# Patient Record
Sex: MALE | Race: OTHER | NOT HISPANIC OR LATINO | Employment: UNEMPLOYED | URBAN - METROPOLITAN AREA
[De-identification: names, ages, dates, MRNs, and addresses within clinical notes are randomized per-mention and may not be internally consistent; named-entity substitution may affect disease eponyms.]

---

## 2019-12-13 ENCOUNTER — HOSPITAL ENCOUNTER (EMERGENCY)
Facility: HOSPITAL | Age: 24
Discharge: HOME | End: 2019-12-13
Attending: EMERGENCY MEDICINE

## 2019-12-13 VITALS
HEART RATE: 88 BPM | HEIGHT: 64 IN | DIASTOLIC BLOOD PRESSURE: 66 MMHG | RESPIRATION RATE: 20 BRPM | OXYGEN SATURATION: 98 % | BODY MASS INDEX: 30.73 KG/M2 | WEIGHT: 180 LBS | TEMPERATURE: 99 F | SYSTOLIC BLOOD PRESSURE: 144 MMHG

## 2019-12-13 DIAGNOSIS — R10.9 ABDOMINAL PAIN, UNSPECIFIED ABDOMINAL LOCATION: Primary | ICD-10-CM

## 2019-12-13 LAB
BILIRUB UR QL STRIP: NEGATIVE
CLARITY UR: CLEAR
COLOR UR: YELLOW
GLUCOSE UR QL STRIP: NEGATIVE
HGB UR QL STRIP: NEGATIVE
KETONES UR QL STRIP: NEGATIVE
LEUKOCYTE ESTERASE UR QL STRIP: NEGATIVE
NITRITE UR QL STRIP: NEGATIVE
PH UR STRIP: 5 [PH] (ref 5–8)
PROT UR QL STRIP: NEGATIVE
SP GR UR STRIP: 1.02 (ref 1–1.03)
URN SPEC COLLECT METH UR: NORMAL
UROBILINOGEN UR STRIP-ACNC: NEGATIVE EU/DL

## 2019-12-13 PROCEDURE — 96372 THER/PROPH/DIAG INJ SC/IM: CPT

## 2019-12-13 PROCEDURE — 81003 URINALYSIS AUTO W/O SCOPE: CPT

## 2019-12-13 PROCEDURE — 87491 CHLMYD TRACH DNA AMP PROBE: CPT

## 2019-12-13 PROCEDURE — 63600175 PHARM REV CODE 636 W HCPCS: Performed by: NURSE PRACTITIONER

## 2019-12-13 PROCEDURE — 99284 EMERGENCY DEPT VISIT MOD MDM: CPT | Mod: 25

## 2019-12-13 RX ORDER — DICYCLOMINE HYDROCHLORIDE 20 MG/1
20 TABLET ORAL 2 TIMES DAILY
Qty: 20 TABLET | Refills: 0 | Status: SHIPPED | OUTPATIENT
Start: 2019-12-13 | End: 2020-01-12

## 2019-12-13 RX ORDER — KETOROLAC TROMETHAMINE 30 MG/ML
30 INJECTION, SOLUTION INTRAMUSCULAR; INTRAVENOUS
Status: COMPLETED | OUTPATIENT
Start: 2019-12-13 | End: 2019-12-13

## 2019-12-13 RX ADMIN — KETOROLAC TROMETHAMINE 30 MG: 30 INJECTION, SOLUTION INTRAMUSCULAR; INTRAVENOUS at 10:12

## 2019-12-14 NOTE — ED PROVIDER NOTES
"Encounter Date: 12/13/2019    SCRIBE #1 NOTE: I, Giovanny Copeland, am scribing for, and in the presence of,  Barbi Parsons NP. I have scribed the following portions of the note - Other sections scribed: HPI, ROS.       History     Chief Complaint   Patient presents with    Abdominal Pain     pt report sharp pain in abdomen (LLQ) around 0300 this am and denies nvfd.  he has not taken      CC: Abdominal Pain    HPI: This 24 y.o. Male with no pertinent past medical history presents to the ED for an evaluation of sharp LLQ abdominal pain with associated "thick urine" and mild radiation to L testicular pain since 3am this morning. Pt reports the pain woke him up and he could not go back to sleep. Pt denies frequency, fever, diarrhea, nausea or emesis. He has not taken any meds for his symptoms. Pt states his urine has been "thick" and can not further explain description. No alleviating factors present. Pt reports worsening of pain when he bends over or "puts pressure" on the area.     The history is provided by the patient. No  was used.     Review of patient's allergies indicates:  No Known Allergies  History reviewed. No pertinent past medical history.  History reviewed. No pertinent surgical history.  History reviewed. No pertinent family history.  Social History     Tobacco Use    Smoking status: Current Every Day Smoker     Types: Cigarettes   Substance Use Topics    Alcohol use: Not Currently    Drug use: Never     Review of Systems   Constitutional: Negative for fever.   HENT: Negative for sore throat.    Respiratory: Negative for shortness of breath.    Cardiovascular: Negative for chest pain.   Gastrointestinal: Positive for abdominal pain. Negative for diarrhea, nausea and vomiting.   Genitourinary: Positive for testicular pain. Negative for dysuria and frequency. Difficulty urinating: slight.   Musculoskeletal: Negative for back pain.   Skin: Negative for rash.   Neurological: Negative " for weakness.   Hematological: Does not bruise/bleed easily.       Physical Exam     Initial Vitals [12/13/19 2123]   BP Pulse Resp Temp SpO2   (!) 144/66 88 20 98.6 °F (37 °C) 98 %      MAP       --         Physical Exam    Nursing note and vitals reviewed.  Constitutional: He appears well-developed and well-nourished.   HENT:   Head: Normocephalic.   Eyes: Conjunctivae are normal.   Neck: Normal range of motion. Neck supple.   Cardiovascular: Normal rate, regular rhythm and normal heart sounds.   Pulmonary/Chest: Effort normal and breath sounds normal. No respiratory distress.   Abdominal: Soft. Normal appearance and bowel sounds are normal. He exhibits no distension. There is no hepatosplenomegaly. There is tenderness in the left lower quadrant. There is no rebound and no guarding. No hernia.   Genitourinary: Testes normal and penis normal. Cremasteric reflex is present. Right testis shows no mass, no swelling and no tenderness. Left testis shows no mass, no swelling and no tenderness.   Musculoskeletal: Normal range of motion.   Neurological: He is alert and oriented to person, place, and time.   Skin: Skin is warm and dry. Capillary refill takes less than 2 seconds.   Psychiatric: He has a normal mood and affect.         ED Course   Procedures  Labs Reviewed   C. TRACHOMATIS/N. GONORRHOEAE BY AMP DNA   URINALYSIS, REFLEX TO URINE CULTURE    Narrative:     Preferred Collection Type->Urine, Clean Catch          Imaging Results          CT Renal Stone Study ABD Pelvis WO (Final result)  Result time 12/13/19 22:49:20    Final result by Tex Edouard MD (12/13/19 22:49:20)                 Impression:      No evidence of nephrolithiasis or obstructive uropathy.    Fluid distention of the small bowel loops.  The findings may represent enteritis.      Electronically signed by: Tex Edouard MD  Date:    12/13/2019  Time:    22:49             Narrative:    EXAMINATION:  CT RENAL STONE STUDY ABD PELVIS WO    CLINICAL  HISTORY:  Abdominal pain, unspecified;    TECHNIQUE:  Axial CT scan of the abdomen and pelvis was obtained from the level of the hemidiaphragms to the pubic symphysis without intravenous contrast. Coronal and sagittal reformats were obtained. The study was performed using a renal stone protocol.  Therefore, no intravenous or oral IV contrast was administered.    COMPARISON:  None.    FINDINGS:  There are no pleural effusions.  There is no evidence of a pneumothorax.  No airspace opacity is present.  No discrete pulmonary nodule is identified.    The heart is unremarkable.  There is normal tapering of the abdominal aorta.  The aortic branch vessels are within normal limits.  There is no evidence of lymphadenopathy in the abdomen or pelvis.    The esophagus, stomach, and duodenum are within normal limits.  There is fluid distention of the small bowel loops.  The appendix is unremarkable.  There is scattered colonic diverticula without evidence of acute diverticulitis.  There is no evidence of bowel obstruction.    The liver is unremarkable.  The gallbladder is within normal limits.  The biliary tree is unremarkable.  The spleen is unremarkable.  The pancreas is within normal limits.    The adrenal glands are unremarkable.  The kidneys are within normal limits.  There is no evidence of nephrolithiasis.  The ureters and urinary bladder are unremarkable.  The prostate gland is within normal limits.    There is no evidence of free fluid in the abdomen or pelvis.  There is no evidence of free air.  There is no evidence of pneumatosis.  No portal venous air is identified.    The psoas margins are unremarkable.  The abdominal and chest wall are within normal limits.  There is a bone island in the right proximal femur.  The osseous structures are unremarkable.                                 Medical Decision Making:   Differential Diagnosis:   Renal colic, UTI, STD  ED Management:  Diagnosis management comments: This is an  "urgent evaluation of a 24-year-old male  that presented to the ER with c/o left lower abdominal pain that radiates to testicle since waking this morning . Pts exam was as above.     CT and Labs were reviewed and discussed with pt.  Urine is negative. CT shows distention of small bowel with fluid which is often reflective enteritis.  Patient has no diarrhea at this time.    Toradol injection given.  Upon re-evaluation patient states it improved the pain mildly.    I have offered patient lab work for further evaluation.  He refuses stating no I just want to go home now.  I will return tomorrow or the next day if any things worse"  patient does not appear uncomfortable.  He is nontoxic in appearance, he does not have a surgical abdomen.  I have encouraged him to follow up with his primary care provider return to the emergency department for fever, worsening pain, vomiting, decrease in appetite or any other concerns.    Based on exam today - I have low suspicion for medical, surgical or other life threatening condition and I believe pt is safe for discharge and outpatient f/u.    Pt verbalizes understanding of d/c instructions and will return for worsening condition.                Scribe Attestation:   Scribe #1: I performed the above scribed service and the documentation accurately describes the services I performed. I attest to the accuracy of the note.            ED Course as of Dec 13 2348   Fri Dec 13, 2019   2221 C. trachomatis/N. gonorrhoeae by AMP DNA [facility]; Urine [CS]      ED Course User Index  [CS] Barbi Parsons NP                Clinical Impression:       ICD-10-CM ICD-9-CM   1. Abdominal pain, unspecified abdominal location R10.9 789.00         Disposition:   Disposition: Discharged  Condition: Stable    Scribe attestation: I, Barbi Parsons NP-C  , personally performed the services described in this documentation. All medical record entries made by the scribe were at my direction and in my " presence.  I have reviewed the chart and agree that the record reflects my personal performance and is accurate and complete.                 Barbi Parsons NP  12/13/19 [id number]

## 2019-12-14 NOTE — ED TRIAGE NOTES
"Patient arrived to ED with c/o sharp LLQ abd pain, constipation, and "thick" urine x 1 day.  Denies n/v, diarrhea, fever, penile discharge, or flank pain.  Reports last bm was yesterday.    "

## 2019-12-14 NOTE — DISCHARGE INSTRUCTIONS
Return to emergency department for fever, vomiting, worsening pain, testicular pain or any other concerns.

## 2019-12-16 LAB
C TRACH DNA SPEC QL NAA+PROBE: NOT DETECTED
N GONORRHOEA DNA SPEC QL NAA+PROBE: NOT DETECTED

## 2021-08-13 ENCOUNTER — LAB VISIT (OUTPATIENT)
Dept: PRIMARY CARE CLINIC | Facility: CLINIC | Age: 26
End: 2021-08-13
Payer: MEDICAID

## 2021-08-13 DIAGNOSIS — Z20.822 ENCOUNTER FOR LABORATORY TESTING FOR COVID-19 VIRUS: ICD-10-CM

## 2021-08-13 PROCEDURE — U0003 INFECTIOUS AGENT DETECTION BY NUCLEIC ACID (DNA OR RNA); SEVERE ACUTE RESPIRATORY SYNDROME CORONAVIRUS 2 (SARS-COV-2) (CORONAVIRUS DISEASE [COVID-19]), AMPLIFIED PROBE TECHNIQUE, MAKING USE OF HIGH THROUGHPUT TECHNOLOGIES AS DESCRIBED BY CMS-2020-01-R: HCPCS | Performed by: INTERNAL MEDICINE

## 2021-08-16 LAB
SARS-COV-2 RNA RESP QL NAA+PROBE: DETECTED
SARS-COV-2- CYCLE NUMBER: 25.18

## 2022-06-08 ENCOUNTER — HOSPITAL ENCOUNTER (EMERGENCY)
Facility: HOSPITAL | Age: 27
Discharge: HOME | End: 2022-06-08
Attending: EMERGENCY MEDICINE
Payer: MEDICAID

## 2022-06-08 VITALS
DIASTOLIC BLOOD PRESSURE: 76 MMHG | SYSTOLIC BLOOD PRESSURE: 122 MMHG | OXYGEN SATURATION: 100 % | TEMPERATURE: 98 F | RESPIRATION RATE: 18 BRPM | HEIGHT: 65 IN | BODY MASS INDEX: 23.99 KG/M2 | WEIGHT: 144 LBS | HEART RATE: 68 BPM

## 2022-06-08 DIAGNOSIS — R10.9 ABDOMINAL PAIN: ICD-10-CM

## 2022-06-08 LAB
ALBUMIN SERPL BCP-MCNC: 4.4 G/DL (ref 3.5–5.2)
ALP SERPL-CCNC: 52 U/L (ref 55–135)
ALT SERPL W/O P-5'-P-CCNC: 24 U/L (ref 10–44)
ANION GAP SERPL CALC-SCNC: 10 MMOL/L (ref 8–16)
AST SERPL-CCNC: 22 U/L (ref 10–40)
BASOPHILS # BLD AUTO: 0.05 K/UL (ref 0–0.2)
BASOPHILS NFR BLD: 0.9 % (ref 0–1.9)
BILIRUB SERPL-MCNC: 0.5 MG/DL (ref 0.1–1)
BILIRUB UR QL STRIP: NEGATIVE
BUN SERPL-MCNC: 23 MG/DL (ref 6–20)
CALCIUM SERPL-MCNC: 9.9 MG/DL (ref 8.7–10.5)
CHLORIDE SERPL-SCNC: 110 MMOL/L (ref 95–110)
CLARITY UR: CLEAR
CO2 SERPL-SCNC: 21 MMOL/L (ref 23–29)
COLOR UR: YELLOW
CREAT SERPL-MCNC: 0.9 MG/DL (ref 0.5–1.4)
DIFFERENTIAL METHOD: NORMAL
EOSINOPHIL # BLD AUTO: 0.2 K/UL (ref 0–0.5)
EOSINOPHIL NFR BLD: 3 % (ref 0–8)
ERYTHROCYTE [DISTWIDTH] IN BLOOD BY AUTOMATED COUNT: 13.2 % (ref 11.5–14.5)
EST. GFR  (AFRICAN AMERICAN): >60 ML/MIN/1.73 M^2
EST. GFR  (NON AFRICAN AMERICAN): >60 ML/MIN/1.73 M^2
GLUCOSE SERPL-MCNC: 90 MG/DL (ref 70–110)
GLUCOSE UR QL STRIP: NEGATIVE
HCT VFR BLD AUTO: 46.2 % (ref 40–54)
HGB BLD-MCNC: 15.9 G/DL (ref 14–18)
HGB UR QL STRIP: NEGATIVE
IMM GRANULOCYTES # BLD AUTO: 0.02 K/UL (ref 0–0.04)
IMM GRANULOCYTES NFR BLD AUTO: 0.4 % (ref 0–0.5)
KETONES UR QL STRIP: NEGATIVE
LEUKOCYTE ESTERASE UR QL STRIP: NEGATIVE
LIPASE SERPL-CCNC: 53 U/L (ref 4–60)
LYMPHOCYTES # BLD AUTO: 2.2 K/UL (ref 1–4.8)
LYMPHOCYTES NFR BLD: 38.5 % (ref 18–48)
MCH RBC QN AUTO: 30.1 PG (ref 27–31)
MCHC RBC AUTO-ENTMCNC: 34.4 G/DL (ref 32–36)
MCV RBC AUTO: 88 FL (ref 82–98)
MONOCYTES # BLD AUTO: 0.5 K/UL (ref 0.3–1)
MONOCYTES NFR BLD: 8.7 % (ref 4–15)
NEUTROPHILS # BLD AUTO: 2.8 K/UL (ref 1.8–7.7)
NEUTROPHILS NFR BLD: 48.5 % (ref 38–73)
NITRITE UR QL STRIP: NEGATIVE
NRBC BLD-RTO: 0 /100 WBC
PH UR STRIP: 7 [PH] (ref 5–8)
PLATELET # BLD AUTO: 209 K/UL (ref 150–450)
PMV BLD AUTO: 10.1 FL (ref 9.2–12.9)
POTASSIUM SERPL-SCNC: 4.3 MMOL/L (ref 3.5–5.1)
PROT SERPL-MCNC: 7.9 G/DL (ref 6–8.4)
PROT UR QL STRIP: NEGATIVE
RBC # BLD AUTO: 5.28 M/UL (ref 4.6–6.2)
SODIUM SERPL-SCNC: 141 MMOL/L (ref 136–145)
SP GR UR STRIP: 1.02 (ref 1–1.03)
URN SPEC COLLECT METH UR: NORMAL
UROBILINOGEN UR STRIP-ACNC: NEGATIVE EU/DL
WBC # BLD AUTO: 5.66 K/UL (ref 3.9–12.7)

## 2022-06-08 PROCEDURE — 81003 URINALYSIS AUTO W/O SCOPE: CPT | Performed by: NURSE PRACTITIONER

## 2022-06-08 PROCEDURE — 83690 ASSAY OF LIPASE: CPT | Performed by: NURSE PRACTITIONER

## 2022-06-08 PROCEDURE — 96360 HYDRATION IV INFUSION INIT: CPT

## 2022-06-08 PROCEDURE — 25500020 PHARM REV CODE 255: Performed by: EMERGENCY MEDICINE

## 2022-06-08 PROCEDURE — 99285 EMERGENCY DEPT VISIT HI MDM: CPT | Mod: 25

## 2022-06-08 PROCEDURE — 25000003 PHARM REV CODE 250: Performed by: NURSE PRACTITIONER

## 2022-06-08 PROCEDURE — 85025 COMPLETE CBC W/AUTO DIFF WBC: CPT | Performed by: NURSE PRACTITIONER

## 2022-06-08 PROCEDURE — 80053 COMPREHEN METABOLIC PANEL: CPT | Performed by: NURSE PRACTITIONER

## 2022-06-08 RX ORDER — MAG HYDROX/ALUMINUM HYD/SIMETH 200-200-20
30 SUSPENSION, ORAL (FINAL DOSE FORM) ORAL ONCE
Status: COMPLETED | OUTPATIENT
Start: 2022-06-08 | End: 2022-06-08

## 2022-06-08 RX ORDER — LIDOCAINE HYDROCHLORIDE 20 MG/ML
10 SOLUTION OROPHARYNGEAL ONCE
Status: COMPLETED | OUTPATIENT
Start: 2022-06-08 | End: 2022-06-08

## 2022-06-08 RX ORDER — FAMOTIDINE 20 MG/1
20 TABLET, FILM COATED ORAL
Status: COMPLETED | OUTPATIENT
Start: 2022-06-08 | End: 2022-06-08

## 2022-06-08 RX ORDER — DICYCLOMINE HYDROCHLORIDE 20 MG/1
20 TABLET ORAL 2 TIMES DAILY
Qty: 10 TABLET | Refills: 0 | Status: SHIPPED | OUTPATIENT
Start: 2022-06-08 | End: 2022-06-13

## 2022-06-08 RX ADMIN — ALUMINUM HYDROXIDE, MAGNESIUM HYDROXIDE, AND SIMETHICONE 30 ML: 200; 200; 20 SUSPENSION ORAL at 08:06

## 2022-06-08 RX ADMIN — IOHEXOL 75 ML: 350 INJECTION, SOLUTION INTRAVENOUS at 11:06

## 2022-06-08 RX ADMIN — LIDOCAINE HYDROCHLORIDE 10 ML: 20 SOLUTION ORAL at 08:06

## 2022-06-08 RX ADMIN — FAMOTIDINE 20 MG: 20 TABLET ORAL at 08:06

## 2022-06-08 RX ADMIN — SODIUM CHLORIDE 1000 ML: 0.9 INJECTION, SOLUTION INTRAVENOUS at 08:06

## 2022-06-08 NOTE — Clinical Note
"Kely Britt" Morris was seen and treated in our emergency department on 6/8/2022.  He may return to work on 06/13/2022.       If you have any questions or concerns, please don't hesitate to call.      Irina Demarco NP"

## 2022-06-08 NOTE — ED PROVIDER NOTES
Encounter Date: 6/8/2022    SCRIBE #1 NOTE: I, Mayurikedar Constantino, am scribing for, and in the presence of,  Irina Demarco NP. I have scribed the following portions of the note - Other sections scribed: HPI, ROS.       History     Chief Complaint   Patient presents with    Back Pain     25 yo male to triage for low back pain and lower abdominal pain since yesterday. Pt denies injury/trauma, dysuria, CP, SOB, N/V/D. VSS, NAD, AAOx4    Abdominal Pain     This 26 y.o male, with no medical history, presents to the ED c/o acute, constant, severe (9/10) abdominal pain and back pain that began yesterday. Pt reports that he abruptly began to experience pain from the belly button up. He states that the pain began to improve throughout the day yesterday, however, upon arriving to work this morning he was unable to stand up due to hunching over in pain. Pt notes that he currently feels constipated as he attempted to have a bowel movement this morning and found the stool to be hard. Additionally, he also notes experiencing upper left shoulder pain yesterday as well. Of note, he reports that for the last month he has noticed that he has been waking up in the middle of the night to urinate, which is abnormal for him. He adds that he is occasionally nauseated and has had a dry mouth for the last couple of days despite drinking 10 bottle of water.  He denies dysuria, hematuria, fever, chills, body aches, emesis, or diarrhea. No other associated symptoms.     The history is provided by the patient.     Review of patient's allergies indicates:  No Known Allergies  No past medical history on file.  No past surgical history on file.  No family history on file.  Social History     Tobacco Use    Smoking status: Current Every Day Smoker     Types: Cigarettes   Substance Use Topics    Alcohol use: Not Currently    Drug use: Never     Review of Systems   Constitutional: Negative for chills and fever.   HENT: Negative for sore throat.          (+) dry mouth   Respiratory: Negative for shortness of breath.    Cardiovascular: Negative for chest pain.   Gastrointestinal: Positive for abdominal pain, constipation and nausea. Negative for diarrhea and vomiting.   Genitourinary: Negative for dysuria and hematuria.        (+) nocturia   Musculoskeletal: Positive for back pain. Negative for myalgias.        (+) upper left shoulder pain   Skin: Negative for rash.   Neurological: Negative for weakness.       Physical Exam     Initial Vitals [06/08/22 0720]   BP Pulse Resp Temp SpO2   (!) 124/58 85 16 98.1 °F (36.7 °C) 98 %      MAP       --         Physical Exam    Nursing note and vitals reviewed.  Constitutional: Vital signs are normal. He appears well-developed and well-nourished. He is not diaphoretic. He is active and cooperative.  Non-toxic appearance. No distress.   Eyes: Pupils are equal, round, and reactive to light.   Neck:   Normal range of motion.  Cardiovascular: Normal heart sounds.   Pulmonary/Chest: Effort normal and breath sounds normal. No respiratory distress.   Abdominal: Abdomen is soft. Bowel sounds are normal. He exhibits no distension. There is abdominal tenderness (epigastrium, periumbilical, LUQ).   No right CVA tenderness.  No left CVA tenderness. There is no rebound and no guarding.   Musculoskeletal:      Cervical back: Normal range of motion.     Neurological: He is alert and oriented to person, place, and time. No sensory deficit.   Skin: Skin is warm and dry. No rash noted. No erythema.   Psychiatric: He has a normal mood and affect.         ED Course   Procedures  Labs Reviewed   COMPREHENSIVE METABOLIC PANEL - Abnormal; Notable for the following components:       Result Value    CO2 21 (*)     BUN 23 (*)     Alkaline Phosphatase 52 (*)     All other components within normal limits   URINALYSIS, REFLEX TO URINE CULTURE    Narrative:     Specimen Source->Urine   CBC W/ AUTO DIFFERENTIAL   LIPASE          Imaging Results          CT  Abdomen Pelvis With Contrast (Final result)  Result time 06/08/22 11:13:37    Final result by Dru Bourgeois Jr., MD (06/08/22 11:13:37)                 Impression:      No acute intra-abdominal process      Electronically signed by: Dru Lerner Jr  Date:    06/08/2022  Time:    11:13             Narrative:    EXAMINATION:  CT ABDOMEN PELVIS WITH CONTRAST    CLINICAL HISTORY:  Abdominal pain, acute, nonlocalized;    TECHNIQUE:  Low dose axial images, sagittal and coronal reformations were obtained from the lung bases to the pubic symphysis following the IV administration of 75 mL of Omnipaque 350    COMPARISON:  None.    FINDINGS:  Abdomen:    - Lung bases: Clear.    - Liver: Normal.    - Gallbladder and bile ducts: Unremarkable.    - Spleen: Unremarkable.    - Pancreas: Normal.    - Kidneys: No mass or hydronephrosis.    - Adrenals: Unremarkable.    - Retroperitoneum:  No significant adenopathy.    - Vascular: Unremarkable.    - Bowel/mesentery: Unremarkable.    Pelvis:    No pelvic mass, adenopathy, or free fluid.    Bones:  Unremarkable.                               US Abdomen Limited (Gallbladder) (Final result)  Result time 06/08/22 09:17:42    Final result by Ez Donnelly MD (06/08/22 09:17:42)                 Impression:      Contracted gallbladder.  Otherwise, unremarkable examination.      Electronically signed by: Ez Donnelly MD  Date:    06/08/2022  Time:    09:17             Narrative:    EXAMINATION:  US ABDOMEN LIMITED    CLINICAL HISTORY:  Unspecified abdominal pain    TECHNIQUE:  Limited ultrasound of the right upper quadrant of the abdomen focused on the biliary system was performed.    COMPARISON:  None    FINDINGS:  Gallbladder: Contracted.  No calculi, wall thickening, or pericholecystic fluid.  No sonographic Clarke's sign.    Biliary system: The common duct is not dilated, measuring 2.8 mm.  No intrahepatic ductal dilatation.    Pancreas: The visualized portions of pancreas  appear normal.    Miscellaneous: No upper abdominal ascites and no abnormalities of the visualized liver.                                 Medications   sodium chloride 0.9% bolus 1,000 mL (0 mLs Intravenous Stopped 6/8/22 0929)   famotidine tablet 20 mg (20 mg Oral Given 6/8/22 0831)   aluminum-magnesium hydroxide-simethicone 200-200-20 mg/5 mL suspension 30 mL (30 mLs Oral Given 6/8/22 0838)     And   LIDOcaine HCl 2% oral solution 10 mL (10 mLs Oral Given 6/8/22 0838)   iohexoL (OMNIPAQUE 350) injection 75 mL (75 mLs Intravenous Given 6/8/22 1102)     Medical Decision Making:   Differential Diagnosis:   Nephrolithiasis, UTI, colitis, diverticulitis, cholecystitis, gallstones, gastritis vs PUD, appendicitis  ED Management:  This is an urgent evaluation of a 26-year-old male that presents to the emergency room complaining of upper abdominal pain radiating to his bilateral lower back and upper left shoulder for 2 days.  Patient also reports nocturia that is new and ongoing for the past month.  He denies dysuria, hematuria, changes in appetite, worsening or improving pain with eating, fever, shortness of breath, chest pain.  I carefully considered but doubt dissection, perforated viscus, sepsis, PE.  Labs and urine today are unremarkable.  CT abdomen and pelvis shows no acute findings to explain the patient's pain.  Right upper quadrant ultrasound was also unremarkable.  Although it is unclear what is causing the patient's abdominal pain, I do not believe it is serious or life-threatening at this time and believe he is stable for discharge in outpatient management.  Patient's pain did improve after receiving a GI cocktail and Pepcid in the emergency room, though it had not completely resolved.  Recommended bland diet, hydration, and OTC antacids for supportive care.  Recommended returning to the emergency room for any new or worsening symptoms or concerns, otherwise to follow-up with his PCP for further evaluation  management.          Scribe Attestation:   Scribe #1: I performed the above scribed service and the documentation accurately describes the services I performed. I attest to the accuracy of the note.                 Clinical Impression:   Final diagnoses:  [R10.9] Abdominal pain          ED Disposition Condition    Discharge Stable        ED Prescriptions     Medication Sig Dispense Start Date End Date Auth. Provider    dicyclomine (BENTYL) 20 mg tablet Take 1 tablet (20 mg total) by mouth 2 (two) times daily. for 5 days 10 tablet 6/8/2022 6/13/2022 Irina Demarco NP        Follow-up Information     Follow up With Specialties Details Why Contact [facility] Emergency Dept Emergency Medicine  As needed, If symptoms worsen [address]  [facility] [id number] [phone]           I, Irina Demarco, personally performed the services described in this documentation. All medical record entries made by the scribe were at my direction and in my presence. I have reviewed the chart and agree that the record reflects my personal performance and is accurate and complete.     Irina Demarco NP  06/08/22 [id number]

## 2022-06-08 NOTE — Clinical Note
"Kely Britt" Morris was seen and treated in our emergency department on 6/8/2022.  He may return to work on 06/10/2022.       If you have any questions or concerns, please don't hesitate to call.      Irina Demarco NP"